# Patient Record
(demographics unavailable — no encounter records)

---

## 2025-07-07 NOTE — PROCEDURE
[FreeTextEntry1] : Chest x-ray PA view only July 7, 2025 Cardiomegaly Prominent vascular markings No parenchymal or pleural effusion dominant pulmonary nodule PFT July 7, 2025 Moderate obstructive ventilatory impairment Significant bronchodilator response 25% at FEV1 Lung dimes normal with mild air trapping RV/TLC ratio 132% predicted Diffusion normal range 87% predicted   Spirometry July 29, 2024 Mild obstructive ventilatory impairment Borderline high percent response to bronchodilator therapy Data comparison demonstrates significant interval improvement compared to June 14, 2024 End-tidal CO2 37 no evidence of hypercarbia July 29, 2020 noted interval improvement at flow rates with no evidence of hypercarbia from a pulmonary perspective there are no contraindications to undergo scheduled colonoscopy endoscopy   PFT no bronchodilator May 3, 2024 Mild borderline moderate obstructive ventilatory impairment Lung volumes  are normal Decreased ERV secondary to increased abdominal girth Diffusion normal 94% predicted Hemoglobin 10.4 No decline in overall pulmonary physiology  EKG May 3, 2024 Sinus rhythm First-degree AV block Reports can cannot exclude ectopic ventricular couplets  Chest x-ray PA lateral May 3, 2024 Cardiac size globular but grossly normal Uncoiled aorta The lung fields are grossly clear without parenchymal infiltrate pleural effusion or dominant pulmonary nodules   Spirometry September 21, 2023 Mild obstructive ventilatory impairment No decline spirometric data  LIV 7/28/23  Moderate OAD mild decline  PFT 6/30/23 Mild OAD Pos BD at FEV1  Lung volumes nl  Pos  airtrapping  DLCO 87 % HGB 12.2  NIOX  11 ppb WNL 5/8/23 POCT 5/8/21  Glucose  64  A1C  5.1  Lando 5/8/23 Mild OAd  no decline  at flow  rates stable  Chest x-ray PA lateral 12/30/2022 Cardiac size grossly normal No parenchymal infiltrates pleural effusions dominant pulmonary nodules No gross interval change compared to chest x-ray dating back 11/10/2021  EKG 12/30/2022 Normal sinus rhythm Voltage criteria for LVH   Bone density 12/30/2022 Indication osteopenia rheumatoid arthritis AP spine L1-L4 osteopenia Left femoral neck total left hip normal Interval improvement dating back to 12/24/2020 T score from -2 0.4-2.1 a 2.9% interval improvement Continue current management  Spirometry December 30, 2022 Mild obstructive ventilatory impairment Data comparison to May 12, 2022 demonstrates interval improvement of the flow rates   POCT 5/12/22 Glucose  140  HGBA1C  5.7  PFT 5/12/22 Moderate OAd pos BD ?? learning effect Lung volumes pos airtrapping DLCO 91 % pred  HGB 11.9  PFT 2/20/22 Moderate OAD Pos BD at FEV1  DLCO 1010 % pred WNL difficult breath hold for Lung volumes HGB 10.9  PFT 11/10/2021 mild OAD pos BD at FEV1 15 %  Lung volumes with airtrapping DLCO 85 % WNL HGB 15.0  Chest x-ray PA lateral 11/10/2021 Cardiac size is normal Clear lungs No parenchymal infiltrates pleural effusions or dominant pulmonary nodules Soft tissue bony structures unremarkable Impression clear lungs  EKG November 10, 2021 Normal sinus rhythm Left atrial enlargement Voltage criteria for LVH  EKG 12/24/20 LVH NSR  DEXA 12/24/2020- f/u 12/2022 AP spine L1-4 osteopenia improvement Hip normal  PFT without bronchodilator August 18, 2020 Mild obstructive ventilatory impairment Normal lung volumes Diffusion normal 87% of predicted. No interval decline of pulmonary physiology compared to a study of May 1, 2018  NIOX 17 ppb August 19, 2020  Chest x-ray PA lateral August 19, 2020 Borderline cardiomegaly Grossly clear lung fields No parenchymal infiltrates pleural effusions or dominant pulmonary nodules Soft tissue bony structures grossly unremarkable Shalini mediastinum unremarkable  Prevnar administration August 19, 2020 High-dose flu vaccination IM September 21, 2023   blood draw

## 2025-07-07 NOTE — HISTORY OF PRESENT ILLNESS
[Never] : never [TextBox_4] : Status post formal medicine evaluation 8/10/2024 Status post GI evaluation with recommendation for colonoscopy Preop pulmonary evaluation Longstanding history of asthma No recent flareups deteriorations Patient remains on controller therapy with Symbicort No use of steroids   fatigue tired memory loss ? component depression poor appetite  less appetite well visit completed  Nov 2021 atypical HA with TA radiation  ? hair loss with metformin OFF History asthma without flares Type 2 diabetes mellitus Osteoporosis f/u noted below improvement osteopenia Last visit May 2018 she underwent a clearance for right rotator cuff surgery Eyes cough wheeze chest pain chest tightness Denies any significant shortness of breath Going orthopedic problems with hip pain joint pain and will have scheduled orthopedic consultation per patient Patient works in [employer] therefore would by definition have had some level of covert exposure Does not report COVID-19 symptomatology RS symbicort and notify re if cost issue

## 2025-07-07 NOTE — REVIEW OF SYSTEMS
2022 23:38 [Fatigue] : fatigue [Recent Wt Loss (___ Lbs)] : ~T recent [unfilled] lb weight loss [Diabetes] : diabetes [Negative] : Psychiatric [Thyroid Problem] : no thyroid problem [TextBox_30] : asthma [TextBox_94] : Rotator cuff repair [TextBox_122] : Empty sella based on MRI brain March 3, 2016

## 2025-07-07 NOTE — PHYSICAL EXAM
[No Acute Distress] : no acute distress [Normal Oropharynx] : normal oropharynx [II] : Mallampati Class: II [Normal Appearance] : normal appearance [Supple] : supple [No JVD] : no jvd [Normal Rate/Rhythm] : normal rate/rhythm [Normal Pulses] : normal pulses [Normal S1, S2] : normal s1, s2 [No Murmurs] : no murmurs [No Rubs] : no rubs [No Gallops] : no gallops [No Resp Distress] : no resp distress [Normal Palpation] : normal palpation [Normal Rhythm and Effort] : normal rhythm and effort [Clear to Auscultation Bilaterally] : clear to auscultation bilaterally [Normal to Percussion] : normal to percussion [No Abnormalities] : no abnormalities [Benign] : benign [Not Tender] : not tender [No Masses] : no masses [No Hernias] : no hernias [Normal Gait] : normal gait [No Clubbing] : no clubbing [No Cyanosis] : no cyanosis [No Edema] : no edema [Normal Color/ Pigmentation] : normal color/ pigmentation [No Focal Deficits] : no focal deficits [No Sensory Deficits] : no sensory deficits [Oriented x3] : oriented x3 [Normal Mood] : normal mood [Normal Affect] : normal affect [TextBox_2] : Overweight [TextBox_99] : Right wrist swelling non tender

## 2025-07-07 NOTE — DISCUSSION/SUMMARY
[FreeTextEntry1] : Chronic airflow limitation chronic asthma abnormal PFT \ Discussed with patient adherence to Symbicort reported 160 just 4.52 puffs twice daily with instructions to rinse Discussed with son Noted continue with primary care internal medicine for management and management of diabetes mellitus with endocrinology  Abnormal EKG-cardiology consultation memory loss readvised neurology consultation r/o depression component depression weight loss/poor appetite r/o sec  above microscopic hematuria - cyto  culture and f/u  and Renal  bladder US Stable chronic asthma Diabetes mellitus type 2 Macrocytosis mild anemia noted-recheck B12 and check iron levels History Osteoporosis- DEXA 12/24 -Osteopenia stable f/u jan 2025 Orthopedic issues as noted Reviewed medications but unclear if dosing and usage is correct I did request patient to bring all medications to next visit Reviewed with patient other studies including colonoscopy mammogram with well visit- addressed Hold metformin and trial Glipizide  2 mg  NEURO consult  memory loss  MDI instructions  trial of low-dose Zoloft 25 mg 1 tablet daily with office follow-up 1 month monitor weight reorder At patient request work-up with colonoscopy neurology etc. be done in a stepwise manner

## 2025-07-08 NOTE — HISTORY OF PRESENT ILLNESS
[Family Member] : family member [FreeTextEntry1] : annual physical  [de-identified] : Ms. HADDAD is a 70 year F with PMHX of dementia, HLD, lower back pain, KAPLAN, mild depression presenting for annual physical. Pt son reports memory issues have not improved over past years. Says he saw neurology in [place]. Son and brother asking for letter to be written so that family from [place] can come help take care of her as her sister who is taking care of her is leaving.  Denies chest pain, sob, santos, dizziness, diaphoresis, palpitations, LE swelling, orthopnea, syncope, n/v, headache

## 2025-07-08 NOTE — HISTORY OF PRESENT ILLNESS
[Family Member] : family member [FreeTextEntry1] : annual physical  [de-identified] : Ms. HADDAD is a 70 year F with PMHX of dementia, HLD, lower back pain, KAPLAN, mild depression presenting for annual physical. Pt son reports memory issues have not improved over past years. Says he saw neurology in [place]. Son and brother asking for letter to be written so that family from [place] can come help take care of her as her sister who is taking care of her is leaving.  Denies chest pain, sob, santos, dizziness, diaphoresis, palpitations, LE swelling, orthopnea, syncope, n/v, headache

## 2025-07-08 NOTE — ADDENDUM
[FreeTextEntry1] : This note was written by Raciel Walter on 07/08/2025 acting as medical scribe for Dr. Jamye Dale. I, Dr. Jayme Dale, have read and attest that all the information, medical decision making and discharge instructions within are true and accurate.

## 2025-07-08 NOTE — ADDENDUM
[FreeTextEntry1] : This note was written by Raciel Walter on 07/08/2025 acting as medical scribe for Dr. Jayme Dale. I, Dr. Jayme Dale, have read and attest that all the information, medical decision making and discharge instructions within are true and accurate.